# Patient Record
Sex: MALE | Race: WHITE
[De-identification: names, ages, dates, MRNs, and addresses within clinical notes are randomized per-mention and may not be internally consistent; named-entity substitution may affect disease eponyms.]

---

## 2022-05-20 PROBLEM — Z00.00 ENCOUNTER FOR PREVENTIVE HEALTH EXAMINATION: Status: ACTIVE | Noted: 2022-05-20

## 2022-05-22 ENCOUNTER — NON-APPOINTMENT (OUTPATIENT)
Age: 32
End: 2022-05-22

## 2022-05-23 ENCOUNTER — APPOINTMENT (OUTPATIENT)
Dept: OTOLARYNGOLOGY | Facility: CLINIC | Age: 32
End: 2022-05-23
Payer: COMMERCIAL

## 2022-05-23 VITALS
BODY MASS INDEX: 30.53 KG/M2 | SYSTOLIC BLOOD PRESSURE: 111 MMHG | WEIGHT: 190 LBS | DIASTOLIC BLOOD PRESSURE: 73 MMHG | HEART RATE: 63 BPM | HEIGHT: 66 IN

## 2022-05-23 DIAGNOSIS — H93.13 TINNITUS, BILATERAL: ICD-10-CM

## 2022-05-23 DIAGNOSIS — H90.41 SENSORINEURAL HEARING LOSS, UNILATERAL, RIGHT EAR, WITH UNRESTRICTED HEARING ON THE CONTRALATERAL SIDE: ICD-10-CM

## 2022-05-23 PROCEDURE — 92570 ACOUSTIC IMMITANCE TESTING: CPT

## 2022-05-23 PROCEDURE — 99204 OFFICE O/P NEW MOD 45 MIN: CPT

## 2022-05-23 PROCEDURE — 92557 COMPREHENSIVE HEARING TEST: CPT

## 2022-05-23 RX ORDER — RISPERIDONE 2 MG/1
2 TABLET ORAL
Refills: 0 | Status: ACTIVE | COMMUNITY

## 2022-05-23 RX ORDER — LITHIUM CARBONATE 450 MG/1
450 TABLET ORAL
Refills: 0 | Status: ACTIVE | COMMUNITY

## 2022-05-23 NOTE — DATA REVIEWED
[de-identified] : type A tymps AU\par AS: hearing is -8000 Hz\par AD: hearing -3000 Hz precipitously sloping to a moderately severe SNHL 4-8kHz\par 1) ent f/u ) re-eval as per MD 3) continued use of right hearing aid

## 2022-05-23 NOTE — HISTORY OF PRESENT ILLNESS
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  MRI at that time didn't show any cause for his hearing loss, as per pt\par +head trauma w LOC 13 yrs old\par -FH

## 2022-05-23 NOTE — ASSESSMENT
[FreeTextEntry1] : Request records from prior ENT\par \par   AS WNL w type A, AD -3000Hz sloping to mod-severe SNHL 4000-8000Hz w type A\par continued use of HA\par annual audio

## 2022-05-24 ENCOUNTER — APPOINTMENT (OUTPATIENT)
Dept: PHARMACY | Facility: CLINIC | Age: 32
End: 2022-05-24
Payer: SELF-PAY

## 2022-05-24 PROCEDURE — V5014I: CUSTOM

## 2022-05-24 PROCEDURE — V5268A: CUSTOM

## 2022-06-06 ENCOUNTER — APPOINTMENT (OUTPATIENT)
Dept: PHARMACY | Facility: CLINIC | Age: 32
End: 2022-06-06
Payer: SELF-PAY

## 2022-06-06 PROCEDURE — V5299A: CUSTOM | Mod: NC

## 2022-08-04 ENCOUNTER — NON-APPOINTMENT (OUTPATIENT)
Age: 32
End: 2022-08-04

## 2023-01-05 ENCOUNTER — APPOINTMENT (OUTPATIENT)
Dept: OTOLARYNGOLOGY | Facility: CLINIC | Age: 33
End: 2023-01-05

## 2023-07-28 ENCOUNTER — APPOINTMENT (OUTPATIENT)
Dept: PHARMACY | Facility: CLINIC | Age: 33
End: 2023-07-28
Payer: SELF-PAY

## 2023-07-28 PROCEDURE — V5299A: CUSTOM | Mod: NC

## 2023-08-08 ENCOUNTER — APPOINTMENT (OUTPATIENT)
Dept: PHARMACY | Facility: CLINIC | Age: 33
End: 2023-08-08

## 2023-11-01 ENCOUNTER — NON-APPOINTMENT (OUTPATIENT)
Age: 33
End: 2023-11-01

## 2023-12-07 ENCOUNTER — APPOINTMENT (OUTPATIENT)
Dept: PHARMACY | Facility: CLINIC | Age: 33
End: 2023-12-07
Payer: SELF-PAY

## 2023-12-07 PROCEDURE — V5014F: CUSTOM | Mod: RT

## 2023-12-19 ENCOUNTER — APPOINTMENT (OUTPATIENT)
Dept: PHARMACY | Facility: CLINIC | Age: 33
End: 2023-12-19
Payer: SELF-PAY

## 2023-12-19 PROCEDURE — V5299A: CUSTOM

## 2024-07-11 ENCOUNTER — APPOINTMENT (OUTPATIENT)
Dept: PHARMACY | Facility: CLINIC | Age: 34
End: 2024-07-11

## 2024-08-21 ENCOUNTER — APPOINTMENT (OUTPATIENT)
Dept: PHARMACY | Facility: CLINIC | Age: 34
End: 2024-08-21
Payer: SELF-PAY

## 2024-08-21 PROCEDURE — V5299A: CUSTOM

## 2024-08-22 ENCOUNTER — APPOINTMENT (OUTPATIENT)
Dept: PHARMACY | Facility: CLINIC | Age: 34
End: 2024-08-22